# Patient Record
Sex: FEMALE | Race: WHITE
[De-identification: names, ages, dates, MRNs, and addresses within clinical notes are randomized per-mention and may not be internally consistent; named-entity substitution may affect disease eponyms.]

---

## 2021-05-08 ENCOUNTER — HOSPITAL ENCOUNTER (EMERGENCY)
Dept: HOSPITAL 26 - MED | Age: 41
Discharge: HOME | End: 2021-05-08
Payer: MEDICAID

## 2021-05-08 VITALS — DIASTOLIC BLOOD PRESSURE: 75 MMHG | SYSTOLIC BLOOD PRESSURE: 132 MMHG

## 2021-05-08 VITALS — SYSTOLIC BLOOD PRESSURE: 108 MMHG | DIASTOLIC BLOOD PRESSURE: 68 MMHG

## 2021-05-08 VITALS — WEIGHT: 190 LBS | HEIGHT: 60 IN | BODY MASS INDEX: 37.3 KG/M2

## 2021-05-08 DIAGNOSIS — H57.12: Primary | ICD-10-CM

## 2021-05-08 DIAGNOSIS — R42: ICD-10-CM

## 2021-05-08 NOTE — NUR
41 YO F BIB SELF FOR C/O L EAR PAIN. PT STATES ACHINESS/THROBBING IN NATURE. 
DENIES FEVER CHILLS, DRAINAGE COMING OUT OF EAR. PT DID STATE SHE HAD CO 
DIZZINESS. WILL UPDATE ERMD WILL CONTINUE TO OBSERVE



AX DENIES

RX DENIES

HX DENIES



LMP 4-17-21

## 2021-05-27 ENCOUNTER — HOSPITAL ENCOUNTER (EMERGENCY)
Dept: HOSPITAL 26 - MED | Age: 41
LOS: 1 days | Discharge: HOME | End: 2021-05-28
Payer: MEDICAID

## 2021-05-27 VITALS — HEIGHT: 60 IN | WEIGHT: 207 LBS | BODY MASS INDEX: 40.64 KG/M2

## 2021-05-27 DIAGNOSIS — Z79.899: ICD-10-CM

## 2021-05-27 DIAGNOSIS — L03.011: Primary | ICD-10-CM

## 2021-05-28 VITALS — DIASTOLIC BLOOD PRESSURE: 80 MMHG | SYSTOLIC BLOOD PRESSURE: 125 MMHG

## 2021-05-28 VITALS — SYSTOLIC BLOOD PRESSURE: 125 MMHG | DIASTOLIC BLOOD PRESSURE: 80 MMHG

## 2021-05-28 RX ADMIN — LIDOCAINE AND PRILOCAINE ONE GM: 25; 25 CREAM TOPICAL at 01:03

## 2021-05-28 RX ADMIN — HYDROCODONE BITARTRATE AND ACETAMINOPHEN ONE TAB: 5; 325 TABLET ORAL at 02:22

## 2021-05-28 NOTE — NUR
Patient discharged with v/s stable. Written and verbal after care instructions 
given and explained. 

Patient alert, oriented and verbalized understanding of instructions. 
Ambulatory with steady gait. All questions addressed prior to discharge. ID 
band removed. Patient advised to follow up with PMD. Rx of IBUPROFEN, BACTRIM 
given. Patient educated on indication of medication including possible reaction 
and side effects. Opportunity to ask questions provided and answered.

## 2021-07-03 ENCOUNTER — HOSPITAL ENCOUNTER (EMERGENCY)
Dept: HOSPITAL 26 - MED | Age: 41
Discharge: HOME | End: 2021-07-03
Payer: MEDICAID

## 2021-07-03 VITALS — SYSTOLIC BLOOD PRESSURE: 104 MMHG | DIASTOLIC BLOOD PRESSURE: 67 MMHG

## 2021-07-03 VITALS — HEIGHT: 60 IN | BODY MASS INDEX: 39.46 KG/M2 | WEIGHT: 201 LBS

## 2021-07-03 DIAGNOSIS — S39.012A: Primary | ICD-10-CM

## 2021-07-03 DIAGNOSIS — Z98.890: ICD-10-CM
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
## 2021-07-03 DIAGNOSIS — Z79.899: ICD-10-CM
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
## 2021-07-03 PROCEDURE — 81002 URINALYSIS NONAUTO W/O SCOPE: CPT

## 2021-07-03 PROCEDURE — 81025 URINE PREGNANCY TEST: CPT

## 2021-07-03 PROCEDURE — 96372 THER/PROPH/DIAG INJ SC/IM: CPT

## 2021-07-03 PROCEDURE — 99283 EMERGENCY DEPT VISIT LOW MDM: CPT

## 2021-07-03 NOTE — NUR
40 YEAR OLD FEMALE COMPLAINS OF LOWER BACK PAIN X2 DAYS. PT STATES SHE LIFTED 
SOMETHING HEAVY AT WORK AND HAS HAD PAIN SINCE. PT DENIES PROBLEMS IN URINATION 
OR DEFECATION. PT AOX4, BREATHING EVEN AND UNLABORED, SKIN WAMR AND DRY. BED IN 
LOWEST POSITION, LOCKED, BED RAIL UPX1.



PMH - DENIES

ALLERGIES - NKA